# Patient Record
Sex: MALE | Race: WHITE | Employment: UNEMPLOYED | URBAN - METROPOLITAN AREA
[De-identification: names, ages, dates, MRNs, and addresses within clinical notes are randomized per-mention and may not be internally consistent; named-entity substitution may affect disease eponyms.]

---

## 2017-03-26 ENCOUNTER — HOSPITAL ENCOUNTER (EMERGENCY)
Age: 47
Discharge: HOME | End: 2017-03-26
Attending: EMERGENCY MEDICINE | Admitting: EMERGENCY MEDICINE
Payer: COMMERCIAL

## 2017-03-26 ENCOUNTER — APPOINTMENT (OUTPATIENT)
Dept: ULTRASOUND IMAGING | Age: 47
End: 2017-03-26
Attending: NURSE PRACTITIONER
Payer: COMMERCIAL

## 2017-03-26 VITALS
RESPIRATION RATE: 18 BRPM | BODY MASS INDEX: 28.18 KG/M2 | WEIGHT: 196.87 LBS | TEMPERATURE: 97.5 F | OXYGEN SATURATION: 97 % | SYSTOLIC BLOOD PRESSURE: 141 MMHG | DIASTOLIC BLOOD PRESSURE: 62 MMHG | HEIGHT: 70 IN | HEART RATE: 82 BPM

## 2017-03-26 DIAGNOSIS — N50.811 TESTICULAR PAIN, RIGHT: Primary | ICD-10-CM

## 2017-03-26 LAB
APPEARANCE UR: CLEAR
BACTERIA URNS QL MICRO: NEGATIVE /HPF
BILIRUB UR QL: NEGATIVE
COLOR UR: ABNORMAL
EPITH CASTS URNS QL MICRO: ABNORMAL /LPF
GLUCOSE UR STRIP.AUTO-MCNC: NEGATIVE MG/DL
HGB UR QL STRIP: NEGATIVE
KETONES UR QL STRIP.AUTO: ABNORMAL MG/DL
LEUKOCYTE ESTERASE UR QL STRIP.AUTO: NEGATIVE
NITRITE UR QL STRIP.AUTO: NEGATIVE
PH UR STRIP: 6 [PH] (ref 5–8)
PROT UR STRIP-MCNC: NEGATIVE MG/DL
RBC #/AREA URNS HPF: ABNORMAL /HPF (ref 0–5)
SP GR UR REFRACTOMETRY: 1.02 (ref 1–1.03)
UROBILINOGEN UR QL STRIP.AUTO: 0.2 EU/DL (ref 0.2–1)
WBC URNS QL MICRO: ABNORMAL /HPF (ref 0–4)

## 2017-03-26 PROCEDURE — 99283 EMERGENCY DEPT VISIT LOW MDM: CPT

## 2017-03-26 PROCEDURE — 81001 URINALYSIS AUTO W/SCOPE: CPT | Performed by: NURSE PRACTITIONER

## 2017-03-26 PROCEDURE — 76870 US EXAM SCROTUM: CPT

## 2017-03-26 PROCEDURE — 87491 CHLMYD TRACH DNA AMP PROBE: CPT | Performed by: NURSE PRACTITIONER

## 2017-03-26 PROCEDURE — 87491 CHLMYD TRACH DNA AMP PROBE: CPT | Performed by: EMERGENCY MEDICINE

## 2017-03-26 RX ORDER — GUAIFENESIN 100 MG/5ML
81 LIQUID (ML) ORAL DAILY
COMMUNITY

## 2017-03-26 RX ORDER — FLUTICASONE PROPIONATE 50 MCG
2 SPRAY, SUSPENSION (ML) NASAL DAILY
COMMUNITY

## 2017-03-26 RX ORDER — ATORVASTATIN CALCIUM 10 MG/1
10 TABLET, FILM COATED ORAL DAILY
COMMUNITY

## 2017-03-26 NOTE — ED PROVIDER NOTES
HPI Comments: 47 yo M with a hx of cholecystitis, high cholesterol  presents ambulatory to the ED with c/o R testicular burning and aching sensation for one week, worse today. He denies fever, chills, nausea, vomiting, diarrhea, CP, SOB, penile discharge, scrotal swelling or tenderness to palpation. States sx started after heavy exercise and core exercises one week ago. There has been no treatment PTA     Past Medical History:  5/7/2013: Acute cholecystitis    Social History    Marital status:              Spouse name:                       Years of education:                 Number of children:               Occupational History    None on file    Social History Main Topics    Smoking status: Former Smoker                                                                Packs/day: 0.00      Years: 0.00        Smokeless status: Current User                      Comment: not often    Alcohol use: Yes           1.5 oz/week       3 Shots of liquor per week    Drug use: No              Sexual activity: Yes               Partners with: Female    Other Topics            Concern    None on file    Social History Narrative    None on file          Patient is a 48 y.o. male presenting with testicular pain. Testicle Pain   Pertinent negatives include no dysuria and no penile pain. Pertinent negatives include no nausea, no vomiting, no abdominal pain, no frequency, no constipation and no diarrhea.           Past Medical History:   Diagnosis Date    Acute cholecystitis 5/7/2013       Past Surgical History:   Procedure Laterality Date    HX HEENT      HX OTHER SURGICAL  2011    cyst removed from back-under local    HX OTHER SURGICAL      wisdom teeth pulled         Family History:   Problem Relation Age of Onset    Malignant Hyperthermia Neg Hx        Social History     Social History    Marital status:      Spouse name: N/A    Number of children: N/A    Years of education: N/A     Occupational History  Not on file. Social History Main Topics    Smoking status: Former Smoker    Smokeless tobacco: Current User      Comment: not often    Alcohol use 1.5 oz/week     3 Shots of liquor per week    Drug use: No    Sexual activity: Yes     Partners: Female     Other Topics Concern    Not on file     Social History Narrative         ALLERGIES: Clindamycin and Penicillins    Review of Systems   Constitutional: Negative for activity change, appetite change, chills and fever. HENT: Negative for ear discharge and facial swelling. Eyes: Negative for redness. Respiratory: Negative for shortness of breath and wheezing. Cardiovascular: Negative for chest pain, palpitations and leg swelling. Gastrointestinal: Negative for abdominal pain, constipation, diarrhea, nausea, rectal pain and vomiting. Genitourinary: Positive for testicular pain. Negative for difficulty urinating, discharge, dysuria, frequency, hematuria, penile pain, penile swelling, scrotal swelling and urgency. Musculoskeletal: Negative for back pain, joint swelling, neck pain and neck stiffness. Skin: Negative for rash. Neurological: Negative for dizziness, seizures, speech difficulty, weakness, light-headedness, numbness and headaches. Psychiatric/Behavioral: Negative for confusion. Vitals:    03/26/17 1831   BP: 138/75   Pulse: 82   Resp: 17   Temp: 97.5 °F (36.4 °C)   SpO2: 97%   Weight: 89.3 kg (196 lb 13.9 oz)   Height: 5' 10\" (1.778 m)            Physical Exam   Constitutional: He is oriented to person, place, and time. He appears well-developed and well-nourished. No distress. HENT:   Head: Normocephalic and atraumatic. Eyes: Conjunctivae and EOM are normal. Pupils are equal, round, and reactive to light. Neck: Normal range of motion. Neck supple. Cardiovascular: Normal rate, regular rhythm, normal heart sounds and intact distal pulses.     Pulmonary/Chest: Effort normal and breath sounds normal. No accessory muscle usage. No tachypnea. No respiratory distress. He has no decreased breath sounds. He has no wheezes. B breath sounds CTA. No expiratory wheezing, crackles or rhonchi. No chest wall tenderness, tachycardia or tachypnea. Abdominal: Soft. Bowel sounds are normal. He exhibits no distension and no mass. There is no tenderness. There is no rigidity, no rebound, no guarding and no CVA tenderness. Abdomen soft, nontender with active BS throughout. No rigidity, masses or guarding. No flank or CVA discomfort. Genitourinary: Testes normal and penis normal. Right testis shows no mass, no swelling and no tenderness. Left testis shows no mass, no swelling and no tenderness. No penile tenderness. No discharge found. Genitourinary Comments: No obvious swelling, discoloration or tenderness to palpation of testicles. (Pt reports burning discomfort to R testicle but states it is not tender to palpation). No penile discharge or tenderness     Musculoskeletal: Normal range of motion. Neurological: He is alert and oriented to person, place, and time. He has normal strength. He displays no atrophy. No cranial nerve deficit or sensory deficit. He exhibits normal muscle tone. Coordination and gait normal. GCS eye subscore is 4. GCS verbal subscore is 5. GCS motor subscore is 6. Skin: Skin is warm and dry. Psychiatric: He has a normal mood and affect. His behavior is normal. Judgment and thought content normal.   Nursing note and vitals reviewed. MDM  Number of Diagnoses or Management Options  Diagnosis management comments: 47 yo M with one week hx of burning sensation to R testicle. Pt reports discomfort started after core exercises and performing heavy exercising. Denies dysuria, hematuria, urinary frequency, penile discharge or pain. No tenderness to palpation of R scrotum. No erythema, edema or discoloration noted. Denies recent car rides, long trip, bicycling. Denies rectal pain or pressure.  B femoral pulses palpable. UA, GC/chlamydia and testicular US ordered. UA negative, GC/Chlamydia testing pending. Scrotal US impression:  Normal appearance of the testicles and epididymis bilaterally. Discussed hx, presentation and findings with Dr. Ned Redding who agrees with plan of care, plan for discharge and plan for FU with urology. Return to the ED for worsening sx. Recommended pt avoid exercising and use anti-inflammatories as directed until evaluation by urology.   Pt is agreeable to this plan of care.           Amount and/or Complexity of Data Reviewed  Clinical lab tests: ordered and reviewed  Tests in the radiology section of CPT®: ordered and reviewed  Discuss the patient with other providers: yes (Dr. Ned Redding)    Patient Progress  Patient progress: stable    ED Course       Procedures

## 2017-03-26 NOTE — ED NOTES
Bedside and Verbal shift change report given to Nargis (oncoming nurse) by Melody Lei (offgoing nurse). Report included the following information SBAR and ED Summary.

## 2017-03-26 NOTE — ED TRIAGE NOTES
Pt presents to ED with c/o intermittent right testicle pain for the past six days. Pt denies any swelling,lesions, dysuria, nausea or vomiting.

## 2017-03-27 NOTE — DISCHARGE INSTRUCTIONS
Testicular Pain: Care Instructions  Your Care Instructions    Pain in the testicles can be caused by many things. These include an injury to your testicles, an infection, and testicular torsion. Injuries and genital problems most often happen during sports or recreational activities, at work, or in a fall. Pain caused by an injury usually goes away quickly. There is usually no long-term harm to your testicles. Infections that may cause pain include:  · An infection of the testicles. This is called orchitis. · An abscess in the scrotum or testicles. · Some sexually transmitted infections (STIs). · A swelling of the tube attached to a testicle. This swelling is called epididymitis. It can cause pain and is sometimes caused by an infection. Testicular torsion happens when a testicle twists on the spermatic cord. This cuts off the blood supply to the testicle. This is a serious condition that requires surgery. Follow-up care is a key part of your treatment and safety. Be sure to make and go to all appointments, and call your doctor if you are having problems. It's also a good idea to know your test results and keep a list of the medicines you take. How can you care for yourself at home? · Rest and protect your testicles and groin. Stop, change, or take a break from any activity that may be causing your pain or soreness. · Put ice or a cold pack on the area for 10 to 20 minutes at a time. Put a thin cloth between the ice and your skin. · Wear briefs, not boxers. Briefs help support the injured area. You can use a jock strap if it helps relieve your pain. · If your doctor prescribed antibiotics, take them as directed. Do not stop taking them just because you feel better. You need to take the full course of antibiotics. · Ask your doctor if you can take an over-the-counter pain medicine, such as acetaminophen (Tylenol), ibuprofen (Advil, Motrin), or naproxen (Aleve). Be safe with medicines.  Read and follow all instructions on the label. · If the doctor gave you a prescription medicine for pain, take it as prescribed. When should you call for help? Call your doctor now or seek immediate medical care if:  · You have severe or increasing pain. · You notice a change in how your testicles look or are positioned in your scrotum. · You notice new or worse swelling in your scrotum. · You have symptoms of a urinary problem, such as a urinary tract infection. These may include:  ¨ Pain or burning when you urinate. ¨ A frequent need to urinate without being able to pass much urine. ¨ Pain in the flank, which is just below the rib cage and above the waist on either side of the back. ¨ Blood in your urine. ¨ A fever. Watch closely for changes in your health, and be sure to contact your doctor if:  · You do not get better as expected. Where can you learn more? Go to http://randolph-tara.info/. Enter T930 in the search box to learn more about \"Testicular Pain: Care Instructions. \"  Current as of: August 12, 2016  Content Version: 11.1  © 5325-3982 [employer]. Care instructions adapted under license by [employer] (which disclaims liability or warranty for this information). If you have questions about a medical condition or this instruction, always ask your healthcare professional. [address] any warranty or liability for your use of this information. We hope that we have addressed all of your medical concerns. The examination and treatment you received in the Emergency Department were for an emergent problem and were not intended as complete care. It is important that you follow up with your healthcare provider(s) for ongoing care. If your symptoms worsen or do not improve as expected, and you are unable to reach your usual health care provider(s), you should return to the Emergency Department.       Today's healthcare is undergoing tremendous change, and patient satisfaction surveys are one of the many tools to assess the quality of medical care. You may receive a survey from the [employer] organization regarding your experience in the Emergency Department. I hope that your experience has been completely positive, particularly the medical care that I provided. As such, please participate in the survey; anything less than excellent does not meet my expectations or intentions. [facility] and [address] [place] [address] participate in nationally recognized quality of care measures. If your blood pressure is greater than 120/80, as reported below, we urge that you seek medical care to address the potential of high blood pressure, commonly known as hypertension. Hypertension can be hereditary or can be caused by certain medical conditions, pain, stress, or \"white coat syndrome. \"       Please make an appointment with your health care provider(s) for follow up of your Emergency Department visit. VITALS:   Patient Vitals for the past 8 hrs:   Temp Pulse Resp BP SpO2   03/26/17 1831 97.5 °F (36.4 °C) 82 17 138/75 97 %          Thank you for allowing us to provide you with medical care today. We realize that you have many choices for your emergency care needs. Please choose us in the future for any continued health care needs. Tino Giordano NP    [facility].   Office: [phone]            Recent Results (from the past 24 hour(s))   URINALYSIS W/MICROSCOPIC    Collection Time: 03/26/17  7:02 PM   Result Value Ref Range    Color YELLOW/STRAW      Appearance CLEAR CLEAR      Specific gravity 1.020 1.003 - 1.030      pH (UA) 6.0 5.0 - 8.0      Protein NEGATIVE  NEG mg/dL    Glucose NEGATIVE  NEG mg/dL    Ketone TRACE (A) NEG mg/dL    Bilirubin NEGATIVE  NEG      Blood NEGATIVE  NEG      Urobilinogen 0.2 0.2 - 1.0 EU/dL    Nitrites NEGATIVE  NEG      Leukocyte Esterase NEGATIVE  NEG      WBC 0-4 0 - 4 /hpf    RBC 0-5 0 - 5 /hpf    Epithelial cells FEW FEW /lpf    Bacteria NEGATIVE  NEG /hpf       Us Scrotum/testicles    Result Date: 3/26/2017  EXAM:  US scrotum INDICATION:  Intermittent right testicle pain for 6 days. COMPARISON:  None. TECHNIQUE:  Real time and color Doppler ultrasound of the scrotal contents. FINDINGS: The right testicle measures 5.7 x 1.8 x 2.7 cm (volume 14.1 mL). The left testicle measures 5.7 x 2.5 x 1.9 cm (volume 13.8 mL). The testicles are normal in size and homogeneous in echotexture without focal lesions. The epididymis is normal bilaterally. There is no significant hydrocele or varicocele. IMPRESSION:  Normal appearance of the testicles and epididymis bilaterally.

## 2017-03-28 LAB
C TRACH DNA SPEC QL NAA+PROBE: NEGATIVE
N GONORRHOEA DNA SPEC QL NAA+PROBE: NEGATIVE
SAMPLE TYPE: NORMAL
SERVICE CMNT-IMP: NORMAL
SPECIMEN SOURCE: NORMAL